# Patient Record
Sex: MALE | Race: OTHER | NOT HISPANIC OR LATINO | URBAN - METROPOLITAN AREA
[De-identification: names, ages, dates, MRNs, and addresses within clinical notes are randomized per-mention and may not be internally consistent; named-entity substitution may affect disease eponyms.]

---

## 2016-12-05 RX ORDER — DOCUSATE SODIUM 100 MG
1 CAPSULE ORAL
Qty: 0 | Refills: 0 | COMMUNITY
Start: 2016-12-05

## 2016-12-05 RX ORDER — POLYETHYLENE GLYCOL 3350 17 G/17G
17 POWDER, FOR SOLUTION ORAL
Qty: 0 | Refills: 0 | COMMUNITY
Start: 2016-12-05

## 2018-01-21 ENCOUNTER — INPATIENT (INPATIENT)
Facility: HOSPITAL | Age: 83
LOS: 3 days | Discharge: HOME | DRG: 57 | End: 2018-01-25
Attending: INTERNAL MEDICINE | Admitting: INTERNAL MEDICINE
Payer: MEDICARE

## 2018-01-21 VITALS
HEART RATE: 64 BPM | DIASTOLIC BLOOD PRESSURE: 88 MMHG | RESPIRATION RATE: 16 BRPM | OXYGEN SATURATION: 100 % | SYSTOLIC BLOOD PRESSURE: 153 MMHG

## 2018-01-21 DIAGNOSIS — R62.7 ADULT FAILURE TO THRIVE: ICD-10-CM

## 2018-01-21 LAB
ALBUMIN SERPL ELPH-MCNC: 3.8 G/DL — SIGNIFICANT CHANGE UP (ref 3.3–5)
ALP SERPL-CCNC: 75 U/L — SIGNIFICANT CHANGE UP (ref 40–120)
ALT FLD-CCNC: 23 U/L RC — SIGNIFICANT CHANGE UP (ref 10–45)
ANION GAP SERPL CALC-SCNC: 12 MMOL/L — SIGNIFICANT CHANGE UP (ref 5–17)
AST SERPL-CCNC: 32 U/L — SIGNIFICANT CHANGE UP (ref 10–40)
BASE EXCESS BLDV CALC-SCNC: 1.4 MMOL/L — SIGNIFICANT CHANGE UP (ref -2–2)
BASOPHILS # BLD AUTO: 0.1 K/UL — SIGNIFICANT CHANGE UP (ref 0–0.2)
BASOPHILS NFR BLD AUTO: 0.7 % — SIGNIFICANT CHANGE UP (ref 0–2)
BILIRUB SERPL-MCNC: 0.3 MG/DL — SIGNIFICANT CHANGE UP (ref 0.2–1.2)
BUN SERPL-MCNC: 24 MG/DL — HIGH (ref 7–23)
CA-I SERPL-SCNC: 1.4 MMOL/L — HIGH (ref 1.12–1.3)
CALCIUM SERPL-MCNC: 10.3 MG/DL — SIGNIFICANT CHANGE UP (ref 8.4–10.5)
CHLORIDE BLDV-SCNC: 103 MMOL/L — SIGNIFICANT CHANGE UP (ref 96–108)
CHLORIDE SERPL-SCNC: 102 MMOL/L — SIGNIFICANT CHANGE UP (ref 96–108)
CO2 BLDV-SCNC: 30 MMOL/L — SIGNIFICANT CHANGE UP (ref 22–30)
CO2 SERPL-SCNC: 25 MMOL/L — SIGNIFICANT CHANGE UP (ref 22–31)
CREAT SERPL-MCNC: 1.37 MG/DL — HIGH (ref 0.5–1.3)
EOSINOPHIL # BLD AUTO: 0.2 K/UL — SIGNIFICANT CHANGE UP (ref 0–0.5)
EOSINOPHIL NFR BLD AUTO: 2.3 % — SIGNIFICANT CHANGE UP (ref 0–6)
GAS PNL BLDV: 140 MMOL/L — SIGNIFICANT CHANGE UP (ref 136–145)
GAS PNL BLDV: SIGNIFICANT CHANGE UP
GLUCOSE BLDV-MCNC: 100 MG/DL — HIGH (ref 70–99)
GLUCOSE SERPL-MCNC: 103 MG/DL — HIGH (ref 70–99)
HCO3 BLDV-SCNC: 28 MMOL/L — SIGNIFICANT CHANGE UP (ref 21–29)
HCT VFR BLD CALC: 37 % — LOW (ref 39–50)
HCT VFR BLDA CALC: 39 % — SIGNIFICANT CHANGE UP (ref 39–50)
HGB BLD CALC-MCNC: 12.7 G/DL — LOW (ref 13–17)
HGB BLD-MCNC: 12.6 G/DL — LOW (ref 13–17)
LACTATE BLDV-MCNC: 1.2 MMOL/L — SIGNIFICANT CHANGE UP (ref 0.7–2)
LYMPHOCYTES # BLD AUTO: 2 K/UL — SIGNIFICANT CHANGE UP (ref 1–3.3)
LYMPHOCYTES # BLD AUTO: 28.1 % — SIGNIFICANT CHANGE UP (ref 13–44)
MAGNESIUM SERPL-MCNC: 2.3 MG/DL — SIGNIFICANT CHANGE UP (ref 1.6–2.6)
MCHC RBC-ENTMCNC: 31.9 PG — SIGNIFICANT CHANGE UP (ref 27–34)
MCHC RBC-ENTMCNC: 34 GM/DL — SIGNIFICANT CHANGE UP (ref 32–36)
MCV RBC AUTO: 93.7 FL — SIGNIFICANT CHANGE UP (ref 80–100)
MONOCYTES # BLD AUTO: 0.5 K/UL — SIGNIFICANT CHANGE UP (ref 0–0.9)
MONOCYTES NFR BLD AUTO: 6.5 % — SIGNIFICANT CHANGE UP (ref 2–14)
NEUTROPHILS # BLD AUTO: 4.6 K/UL — SIGNIFICANT CHANGE UP (ref 1.8–7.4)
NEUTROPHILS NFR BLD AUTO: 62.4 % — SIGNIFICANT CHANGE UP (ref 43–77)
PCO2 BLDV: 59 MMHG — HIGH (ref 35–50)
PH BLDV: 7.31 — LOW (ref 7.35–7.45)
PHOSPHATE SERPL-MCNC: 3.3 MG/DL — SIGNIFICANT CHANGE UP (ref 2.5–4.5)
PLATELET # BLD AUTO: 181 K/UL — SIGNIFICANT CHANGE UP (ref 150–400)
PO2 BLDV: 18 MMHG — LOW (ref 25–45)
POTASSIUM BLDV-SCNC: 4.2 MMOL/L — SIGNIFICANT CHANGE UP (ref 3.5–5)
POTASSIUM SERPL-MCNC: 4.9 MMOL/L — SIGNIFICANT CHANGE UP (ref 3.5–5.3)
POTASSIUM SERPL-SCNC: 4.9 MMOL/L — SIGNIFICANT CHANGE UP (ref 3.5–5.3)
PROT SERPL-MCNC: 7.6 G/DL — SIGNIFICANT CHANGE UP (ref 6–8.3)
RBC # BLD: 3.95 M/UL — LOW (ref 4.2–5.8)
RBC # FLD: 13 % — SIGNIFICANT CHANGE UP (ref 10.3–14.5)
SAO2 % BLDV: 20 % — LOW (ref 67–88)
SODIUM SERPL-SCNC: 139 MMOL/L — SIGNIFICANT CHANGE UP (ref 135–145)
WBC # BLD: 7.3 K/UL — SIGNIFICANT CHANGE UP (ref 3.8–10.5)
WBC # FLD AUTO: 7.3 K/UL — SIGNIFICANT CHANGE UP (ref 3.8–10.5)

## 2018-01-21 PROCEDURE — 70450 CT HEAD/BRAIN W/O DYE: CPT | Mod: 26

## 2018-01-21 PROCEDURE — 72170 X-RAY EXAM OF PELVIS: CPT | Mod: 26

## 2018-01-21 PROCEDURE — 99285 EMERGENCY DEPT VISIT HI MDM: CPT | Mod: 25

## 2018-01-21 PROCEDURE — 93010 ELECTROCARDIOGRAM REPORT: CPT

## 2018-01-21 PROCEDURE — 72125 CT NECK SPINE W/O DYE: CPT | Mod: 26

## 2018-01-21 PROCEDURE — 71045 X-RAY EXAM CHEST 1 VIEW: CPT | Mod: 26

## 2018-01-21 RX ORDER — POLYETHYLENE GLYCOL 3350 17 G/17G
17 POWDER, FOR SOLUTION ORAL DAILY
Qty: 0 | Refills: 0 | Status: DISCONTINUED | OUTPATIENT
Start: 2018-01-21 | End: 2018-01-25

## 2018-01-21 RX ORDER — SODIUM CHLORIDE 9 MG/ML
1000 INJECTION INTRAMUSCULAR; INTRAVENOUS; SUBCUTANEOUS
Qty: 0 | Refills: 0 | Status: DISCONTINUED | OUTPATIENT
Start: 2018-01-21 | End: 2018-01-23

## 2018-01-21 RX ORDER — SODIUM CHLORIDE 9 MG/ML
1000 INJECTION INTRAMUSCULAR; INTRAVENOUS; SUBCUTANEOUS ONCE
Qty: 0 | Refills: 0 | Status: COMPLETED | OUTPATIENT
Start: 2018-01-21 | End: 2018-01-21

## 2018-01-21 RX ORDER — LATANOPROST 0.05 MG/ML
1 SOLUTION/ DROPS OPHTHALMIC; TOPICAL AT BEDTIME
Qty: 0 | Refills: 0 | Status: DISCONTINUED | OUTPATIENT
Start: 2018-01-21 | End: 2018-01-25

## 2018-01-21 RX ORDER — AMLODIPINE BESYLATE 2.5 MG/1
5 TABLET ORAL DAILY
Qty: 0 | Refills: 0 | Status: DISCONTINUED | OUTPATIENT
Start: 2018-01-21 | End: 2018-01-24

## 2018-01-21 RX ORDER — LATANOPROST 0.05 MG/ML
1 SOLUTION/ DROPS OPHTHALMIC; TOPICAL
Qty: 0 | Refills: 0 | COMMUNITY

## 2018-01-21 RX ORDER — SENNA PLUS 8.6 MG/1
2 TABLET ORAL AT BEDTIME
Qty: 0 | Refills: 0 | Status: DISCONTINUED | OUTPATIENT
Start: 2018-01-21 | End: 2018-01-25

## 2018-01-21 RX ORDER — DOCUSATE SODIUM 100 MG
100 CAPSULE ORAL THREE TIMES A DAY
Qty: 0 | Refills: 0 | Status: DISCONTINUED | OUTPATIENT
Start: 2018-01-21 | End: 2018-01-25

## 2018-01-21 RX ORDER — HEPARIN SODIUM 5000 [USP'U]/ML
5000 INJECTION INTRAVENOUS; SUBCUTANEOUS EVERY 8 HOURS
Qty: 0 | Refills: 0 | Status: DISCONTINUED | OUTPATIENT
Start: 2018-01-21 | End: 2018-01-25

## 2018-01-21 RX ADMIN — HEPARIN SODIUM 5000 UNIT(S): 5000 INJECTION INTRAVENOUS; SUBCUTANEOUS at 23:40

## 2018-01-21 RX ADMIN — SODIUM CHLORIDE 1000 MILLILITER(S): 9 INJECTION INTRAMUSCULAR; INTRAVENOUS; SUBCUTANEOUS at 14:00

## 2018-01-21 RX ADMIN — SODIUM CHLORIDE 75 MILLILITER(S): 9 INJECTION INTRAMUSCULAR; INTRAVENOUS; SUBCUTANEOUS at 22:40

## 2018-01-21 NOTE — ED PROVIDER NOTE - PMH
Alzheimer disease    COPD (chronic obstructive pulmonary disease)    Dementia    Dementia in Alzheimer's disease    Glaucoma    HTN (hypertension)    HTN (hypertension)    Open-angle glaucoma, moderate stage, unspecified laterality, unspecified open-angle glaucoma type

## 2018-01-21 NOTE — ED PROVIDER NOTE - CARE PLAN
Principal Discharge DX:	Failure to thrive in adult  Secondary Diagnosis:	Alzheimer's dementia without behavioral disturbance, unspecified timing of dementia onset

## 2018-01-21 NOTE — ED ADULT NURSE REASSESSMENT NOTE - NS ED NURSE REASSESS COMMENT FT1
received report from TRAVIS Boggs. Pt lying on assigned stretcher shows no signs of any distress, no pain noted & VS WNL. awaiting bed placement at this time. Safety maintained & continue monitor.

## 2018-01-21 NOTE — ED PROVIDER NOTE - OBJECTIVE STATEMENT
89yo M with advanced alzheimers progressive decline for last month, more unsteady, minimally verbal, walks with walker, today tried to reach from sitting position and fell to the side, not to ground but may have hit head on wall. no complaint of pain, wife unable to get him up after, has no help at home. no fevers. no cough/vomiting/diarrhea. refusing PO intake and medications last 1-2 weeks.     PCP- KRISTAL SIMMONS

## 2018-01-21 NOTE — ED PROVIDER NOTE - PROGRESS NOTE DETAILS
family refusing straight cath, wife aware need specimen, will use urinal per wife. will admit at this point. CBC redrawn and in lab -Alcieds DO

## 2018-01-21 NOTE — ED PROVIDER NOTE - MEDICAL DECISION MAKING DETAILS
failure to thrive likely from alzheimers, no identified infectious source, no sign of significant trauma. will ct head/neck. labs with ekg. UA/UC. hydration. TBA for rehab/NH. wife unable to care for patient at home. wife is health care proxy and has living will, patient is DNR/DNI no feeding tubes

## 2018-01-21 NOTE — H&P ADULT - HISTORY OF PRESENT ILLNESS
89yo M with advanced alzheimer's progressive decline for last month, more unsteady, minimally verbal, walks with walker, today tried to reach from sitting position and fell to the side, not to ground but may have hit head on wall. no complaint of pain, wife unable to get him up after, has no help at home. no fevers. no cough/vomiting/diarrhea. refusing PO intake and medications last 1-2 weeks.

## 2018-01-21 NOTE — ED ADULT NURSE NOTE - OBJECTIVE STATEMENT
89 year old male presents to ED via EMS complaining of fall out of chair. History of dementia, glaucoma, HTN, COPD. EMS states patient lives with wife, patient was trying to reach for something from chair and slid out of chair, unable to get himself up so wife called 911. Patient has dementia and is at baseline mental status per wife, does not remember falling. Not complaining of any pain at this time. Did not hit head, no LOC. 89 year old male presents to ED via EMS complaining of fall out of chair. History of dementia, glaucoma, HTN, COPD. EMS states patient lives with wife, patient was trying to reach for something from chair and slid out of chair, unable to get himself up so wife called 911. Patient has dementia and is at baseline mental status per wife, does not remember falling. Not complaining of any pain at this time. Did not hit head, no LOC. Wife states he is increasingly difficult to care for at home. Non compliant with medication. Patient undressed and placed into gown, call bell in hand and side rails up with bed in lowest position for safety. blanket provided. Comfort and safety provided.

## 2018-01-21 NOTE — H&P ADULT - NEGATIVE GASTROINTESTINAL SYMPTOMS
no nausea/no constipation/no flatulence/no change in bowel habits/no vomiting/no melena/no diarrhea/no abdominal pain/no hematochezia

## 2018-01-21 NOTE — H&P ADULT - ASSESSMENT
89yo M with advanced alzheimer's progressive decline for last month, more unsteady, minimally verbal, walks with walker, today tried to reach from sitting position and fell to the side, not to ground but may have hit head on wall. no complaint of pain, wife unable to get him up after, has no help at home. no fevers. no cough/vomiting/diarrhea. refusing PO intake and medications last 1-2 weeks.     progressive dementia - check b12 , folate and TSH    anorexia - will consider supplements.  wife does not want a swallow eval at this time.    htn - cw norvasc    copd - doubt pt able to use Advair  will start Pulmicort instead    constipation - colace, senna and Miralax    pt eval    dvt px

## 2018-01-21 NOTE — ED ADULT NURSE REASSESSMENT NOTE - NS ED NURSE REASSESS COMMENT FT1
Wife at bedside does not want us to straight cath patient. Dr. Mcgrath aware that patient is refusing straight cath.

## 2018-01-21 NOTE — ED PROVIDER NOTE - PHYSICAL EXAMINATION
Gen: NAD, nonverbal  Head: NCAT  HEENT: PERRL, oral mucosa slightly dry, normal conjunctiva, neck supple  Lung: CTAB, no respiratory distress  CV: rrr, no murmur, Normal perfusion  Abd: soft, NTND  MSK: No edema, no visible deformities, able to range hips without obvious pain, no reproducible UE/LE pain  Neuro: moving all extremities equally  Skin: No rash

## 2018-01-21 NOTE — H&P ADULT - NSHPLABSRESULTS_GEN_ALL_CORE
LABS:                        12.6   7.3   )-----------( 181      ( 21 Jan 2018 16:51 )             37.0     01-21    139  |  102  |  24<H>  ----------------------------<  103<H>  4.9   |  25  |  1.37<H>    Ca    10.3      21 Jan 2018 13:36  Phos  3.3     01-21  Mg     2.3     01-21    TPro  7.6  /  Alb  3.8  /  TBili  0.3  /  DBili  x   /  AST  32  /  ALT  23  /  AlkPhos  75  01-21              RADIOLOGY & ADDITIONAL TESTS:

## 2018-01-21 NOTE — ED ADULT NURSE REASSESSMENT NOTE - NS ED NURSE REASSESS COMMENT FT1
Patient difficult PIV access, multiple attempts by multiple RNs. Dr. Mcgrath aware and inserted US guided PIV. Fluids hung as ordered and labs sent. Wife at bedside would like to avoid straight cath - states that she will get the urine sample when he voids.

## 2018-01-22 LAB
24R-OH-CALCIDIOL SERPL-MCNC: 40.5 NG/ML — SIGNIFICANT CHANGE UP (ref 30–80)
ANION GAP SERPL CALC-SCNC: 11 MMOL/L — SIGNIFICANT CHANGE UP (ref 5–17)
APPEARANCE UR: CLEAR — SIGNIFICANT CHANGE UP
BILIRUB UR-MCNC: NEGATIVE — SIGNIFICANT CHANGE UP
BUN SERPL-MCNC: 23 MG/DL — SIGNIFICANT CHANGE UP (ref 7–23)
CALCIUM SERPL-MCNC: 9.4 MG/DL — SIGNIFICANT CHANGE UP (ref 8.4–10.5)
CHLORIDE SERPL-SCNC: 106 MMOL/L — SIGNIFICANT CHANGE UP (ref 96–108)
CO2 SERPL-SCNC: 23 MMOL/L — SIGNIFICANT CHANGE UP (ref 22–31)
COLOR SPEC: YELLOW — SIGNIFICANT CHANGE UP
CREAT SERPL-MCNC: 1.16 MG/DL — SIGNIFICANT CHANGE UP (ref 0.5–1.3)
DIFF PNL FLD: NEGATIVE — SIGNIFICANT CHANGE UP
FOLATE SERPL-MCNC: >20 NG/ML — SIGNIFICANT CHANGE UP (ref 4.8–24.2)
GLUCOSE SERPL-MCNC: 86 MG/DL — SIGNIFICANT CHANGE UP (ref 70–99)
GLUCOSE UR QL: NEGATIVE MG/DL — SIGNIFICANT CHANGE UP
HCT VFR BLD CALC: 34.7 % — LOW (ref 39–50)
HGB BLD-MCNC: 11 G/DL — LOW (ref 13–17)
KETONES UR-MCNC: NEGATIVE — SIGNIFICANT CHANGE UP
LEUKOCYTE ESTERASE UR-ACNC: NEGATIVE — SIGNIFICANT CHANGE UP
MAGNESIUM SERPL-MCNC: 2 MG/DL — SIGNIFICANT CHANGE UP (ref 1.6–2.6)
MCHC RBC-ENTMCNC: 29.3 PG — SIGNIFICANT CHANGE UP (ref 27–34)
MCHC RBC-ENTMCNC: 31.7 GM/DL — LOW (ref 32–36)
MCV RBC AUTO: 92.5 FL — SIGNIFICANT CHANGE UP (ref 80–100)
NITRITE UR-MCNC: NEGATIVE — SIGNIFICANT CHANGE UP
PH UR: 7 — SIGNIFICANT CHANGE UP (ref 5–8)
PHOSPHATE SERPL-MCNC: 3 MG/DL — SIGNIFICANT CHANGE UP (ref 2.5–4.5)
PLATELET # BLD AUTO: 178 K/UL — SIGNIFICANT CHANGE UP (ref 150–400)
POTASSIUM SERPL-MCNC: 4 MMOL/L — SIGNIFICANT CHANGE UP (ref 3.5–5.3)
POTASSIUM SERPL-SCNC: 4 MMOL/L — SIGNIFICANT CHANGE UP (ref 3.5–5.3)
PROT UR-MCNC: NEGATIVE MG/DL — SIGNIFICANT CHANGE UP
RBC # BLD: 3.75 M/UL — LOW (ref 4.2–5.8)
RBC # FLD: 14.9 % — HIGH (ref 10.3–14.5)
SODIUM SERPL-SCNC: 140 MMOL/L — SIGNIFICANT CHANGE UP (ref 135–145)
SP GR SPEC: 1.02 — SIGNIFICANT CHANGE UP (ref 1.01–1.02)
TSH SERPL-MCNC: 2.3 UIU/ML — SIGNIFICANT CHANGE UP (ref 0.27–4.2)
UROBILINOGEN FLD QL: NEGATIVE MG/DL — SIGNIFICANT CHANGE UP
VIT B12 SERPL-MCNC: 1647 PG/ML — HIGH (ref 232–1245)
WBC # BLD: 6.51 K/UL — SIGNIFICANT CHANGE UP (ref 3.8–10.5)
WBC # FLD AUTO: 6.51 K/UL — SIGNIFICANT CHANGE UP (ref 3.8–10.5)

## 2018-01-22 PROCEDURE — 93010 ELECTROCARDIOGRAM REPORT: CPT

## 2018-01-22 RX ORDER — HYDRALAZINE HCL 50 MG
5 TABLET ORAL ONCE
Qty: 0 | Refills: 0 | Status: COMPLETED | OUTPATIENT
Start: 2018-01-22 | End: 2018-01-22

## 2018-01-22 RX ADMIN — LATANOPROST 1 DROP(S): 0.05 SOLUTION/ DROPS OPHTHALMIC; TOPICAL at 22:00

## 2018-01-22 RX ADMIN — HEPARIN SODIUM 5000 UNIT(S): 5000 INJECTION INTRAVENOUS; SUBCUTANEOUS at 22:00

## 2018-01-22 RX ADMIN — SENNA PLUS 2 TABLET(S): 8.6 TABLET ORAL at 22:00

## 2018-01-22 RX ADMIN — Medication 100 MILLIGRAM(S): at 14:16

## 2018-01-22 RX ADMIN — SODIUM CHLORIDE 75 MILLILITER(S): 9 INJECTION INTRAMUSCULAR; INTRAVENOUS; SUBCUTANEOUS at 22:00

## 2018-01-22 RX ADMIN — Medication 5 MILLIGRAM(S): at 05:18

## 2018-01-22 RX ADMIN — LATANOPROST 1 DROP(S): 0.05 SOLUTION/ DROPS OPHTHALMIC; TOPICAL at 00:05

## 2018-01-22 RX ADMIN — Medication 0.25 MILLIGRAM(S): at 16:45

## 2018-01-22 RX ADMIN — HEPARIN SODIUM 5000 UNIT(S): 5000 INJECTION INTRAVENOUS; SUBCUTANEOUS at 05:19

## 2018-01-22 RX ADMIN — POLYETHYLENE GLYCOL 3350 17 GRAM(S): 17 POWDER, FOR SOLUTION ORAL at 14:17

## 2018-01-22 NOTE — CHART NOTE - NSCHARTNOTEFT_GEN_A_CORE
Called by nurse with reports that  patient is restless and attempting to get out of bed without assistance. Seen and examined at bedside, patient awake and responsive verbally.  unable to follow commands. Unable to collect EKG. Will give ativan 0.25mg iv stat. Will obtain EKG once patient is calm. will endorse to incoming tour.

## 2018-01-22 NOTE — PROGRESS NOTE ADULT - ASSESSMENT
89yo M with advanced alzheimer's progressive decline for last month, more unsteady, minimally verbal, walks with walker, today tried to reach from sitting position and fell to the side, not to ground but may have hit head on wall. no complaint of pain, wife unable to get him up after, has no help at home. no fevers. no cough/vomiting/diarrhea. refusing PO intake and medications last 1-2 weeks.     progressive dementia - check b12 , folate and TSH    anorexia - will consider supplements.  wife does not want a swallow eval at this time.    htn - cw norvasc    copd - doubt pt able to use Advair  will start Pulmicort instead    constipation - colace, senna and Miralax    glaucoma - cw eye drops    pt eval    dvt px

## 2018-01-22 NOTE — PROGRESS NOTE ADULT - SUBJECTIVE AND OBJECTIVE BOX
Patient is a 89y old  Male who presents with a chief complaint of falling and not eating and refusing meds (2018 20:32)      SUBJECTIVE / OVERNIGHT EVENTS: spoke with wife at beside.  does not want swallow consult.  wants pt on regular    MEDICATIONS  (STANDING):  amLODIPine   Tablet 5 milliGRAM(s) Oral daily  Brinzolamide and Brimonidine (Simbrinza) 1 Drop(s) 1 Drop(s) Both EYES two times a day  docusate sodium 100 milliGRAM(s) Oral three times a day  heparin  Injectable 5000 Unit(s) SubCutaneous every 8 hours  latanoprost 0.005% Ophthalmic Solution 1 Drop(s) Both EYES at bedtime  multivitamin 1 Tablet(s) Oral daily  polyethylene glycol 3350 17 Gram(s) Oral daily  senna 2 Tablet(s) Oral at bedtime  sodium chloride 0.9%. 1000 milliLiter(s) (75 mL/Hr) IV Continuous <Continuous>    MEDICATIONS  (PRN):      Vital Signs Last 24 Hrs  T(C): 36.7 (2018 20:29), Max: 36.8 (2018 23:00)  T(F): 98 (2018 20:29), Max: 98.3 (2018 23:00)  HR: 80 (2018 20:29) (58 - 98)  BP: 160/74 (2018 20:29) (153/76 - 195/78)  BP(mean): --  RR: 18 (2018 20:29) (18 - 18)  SpO2: 98% (2018 20:29) (95% - 100%)  CAPILLARY BLOOD GLUCOSE        I&O's Summary    2018 07:  -  2018 07:00  --------------------------------------------------------  IN: 675 mL / OUT: 0 mL / NET: 675 mL    2018 07:01  -  2018 20:42  --------------------------------------------------------  IN: 1100 mL / OUT: 0 mL / NET: 1100 mL        PHYSICAL EXAM:  GENERAL: NAD, well-developed  HEAD:  Atraumatic, Normocephalic  EYES: EOMI, PERRLA, conjunctiva and sclera clear  NECK: Supple, No JVD  CHEST/LUNG: Clear to auscultation bilaterally; No wheeze  HEART: Regular rate and rhythm; No murmurs, rubs, or gallops  ABDOMEN: Soft, Nontender, Nondistended; Bowel sounds present  EXTREMITIES:  2+ Peripheral Pulses, No clubbing, cyanosis, or edema  PSYCH: AAOx3  NEUROLOGY: non-focal  SKIN: No rashes or lesions    LABS:                        11.0   6.51  )-----------( 178      ( 2018 06:48 )             34.7         140  |  106  |  23  ----------------------------<  86  4.0   |  23  |  1.16    Ca    9.4      2018 07:27  Phos  3.0       Mg     2.0         TPro  7.6  /  Alb  3.8  /  TBili  0.3  /  DBili  x   /  AST  32  /  ALT  23  /  AlkPhos  75            Urinalysis Basic - ( 2018 14:55 )    Color: Yellow / Appearance: Clear / S.016 / pH: x  Gluc: x / Ketone: Negative  / Bili: Negative / Urobili: Negative mg/dL   Blood: x / Protein: Negative mg/dL / Nitrite: Negative   Leuk Esterase: Negative / RBC: x / WBC x   Sq Epi: x / Non Sq Epi: x / Bacteria: x        RADIOLOGY & ADDITIONAL TESTS:    Imaging Personally Reviewed:    Consultant(s) Notes Reviewed:      Care Discussed with Consultants/Other Providers:

## 2018-01-22 NOTE — PHYSICAL THERAPY INITIAL EVALUATION ADULT - DISCHARGE DISPOSITION, PT EVAL
home w/ home PT/patient is close to baseline functional status & unable to consistently follow commands therefore making Subacute Rehab placement difficult

## 2018-01-22 NOTE — PHYSICAL THERAPY INITIAL EVALUATION ADULT - PERTINENT HX OF CURRENT PROBLEM, REHAB EVAL
as per chart review: advanced alzheimer's progressive decline for last month, more unsteady, minimally verbal, walks with walker, today tried to reach from sitting position and fell to the side, not to ground but may have hit head on wall

## 2018-01-23 ENCOUNTER — TRANSCRIPTION ENCOUNTER (OUTPATIENT)
Age: 83
End: 2018-01-23

## 2018-01-23 DIAGNOSIS — Z71.89 OTHER SPECIFIED COUNSELING: ICD-10-CM

## 2018-01-23 DIAGNOSIS — G30.9 ALZHEIMER'S DISEASE, UNSPECIFIED: ICD-10-CM

## 2018-01-23 DIAGNOSIS — R62.7 ADULT FAILURE TO THRIVE: ICD-10-CM

## 2018-01-23 DIAGNOSIS — I10 ESSENTIAL (PRIMARY) HYPERTENSION: ICD-10-CM

## 2018-01-23 DIAGNOSIS — H40.9 UNSPECIFIED GLAUCOMA: ICD-10-CM

## 2018-01-23 DIAGNOSIS — J44.9 CHRONIC OBSTRUCTIVE PULMONARY DISEASE, UNSPECIFIED: ICD-10-CM

## 2018-01-23 RX ORDER — VALPROIC ACID (AS SODIUM SALT) 250 MG/5ML
250 SOLUTION, ORAL ORAL DAILY
Qty: 0 | Refills: 0 | Status: DISCONTINUED | OUTPATIENT
Start: 2018-01-23 | End: 2018-01-23

## 2018-01-23 RX ORDER — VALPROIC ACID (AS SODIUM SALT) 250 MG/5ML
250 SOLUTION, ORAL ORAL DAILY
Qty: 0 | Refills: 0 | Status: DISCONTINUED | OUTPATIENT
Start: 2018-01-23 | End: 2018-01-25

## 2018-01-23 RX ADMIN — Medication 1 PATCH: at 16:10

## 2018-01-23 RX ADMIN — SENNA PLUS 2 TABLET(S): 8.6 TABLET ORAL at 22:45

## 2018-01-23 RX ADMIN — LATANOPROST 1 DROP(S): 0.05 SOLUTION/ DROPS OPHTHALMIC; TOPICAL at 22:45

## 2018-01-23 RX ADMIN — HEPARIN SODIUM 5000 UNIT(S): 5000 INJECTION INTRAVENOUS; SUBCUTANEOUS at 05:40

## 2018-01-23 RX ADMIN — AMLODIPINE BESYLATE 5 MILLIGRAM(S): 2.5 TABLET ORAL at 05:40

## 2018-01-23 RX ADMIN — POLYETHYLENE GLYCOL 3350 17 GRAM(S): 17 POWDER, FOR SOLUTION ORAL at 12:15

## 2018-01-23 RX ADMIN — Medication 250 MILLIGRAM(S): at 12:15

## 2018-01-23 RX ADMIN — HEPARIN SODIUM 5000 UNIT(S): 5000 INJECTION INTRAVENOUS; SUBCUTANEOUS at 22:45

## 2018-01-23 NOTE — DISCHARGE NOTE ADULT - PLAN OF CARE
Chronic AD medical management Follow up with PMD Call your Health Care provider upon arrival home to make a follow up appointment within one week.  Take all inhalers as prescribed by your Health Care Provider.  Take steroids as prescribed by your Health Care Provider.  If your cough increases infrequency and severity and/or you have shortness of breath or increased shortness of breath call your Health Care Provider.  If you develop fever, chills, night sweats, malaise, and/or change in mental status call your Health care Provider.  Nutrition is very important.  Eat small frequent meals.  Increase your activity as tolerated.  Do not stay in bed all day Follow up with your medical doctor to establish long term blood pressure treatment goals.

## 2018-01-23 NOTE — PROGRESS NOTE ADULT - ASSESSMENT
89yo M with advanced alzheimer's progressive decline for last month, more unsteady, minimally verbal, walks with walker, today tried to reach from sitting position and fell to the side, not to ground but may have hit head on wall. no complaint of pain, wife unable to get him up after, has no help at home. no fevers. no cough/vomiting/diarrhea. refusing PO intake and medications last 1-2 weeks.     progressive alzheimers dementia - b12 , folate and TSH all wnl,  geriatrics consult was called.    anorexia - will consider supplements.  wife does not want a swallow eval at this time.  encourage po intake.    uncontrolled htn - cw Norvasc. add clonidine patch.    copd - doubt pt able to use Advair  will start Pulmicort instead    constipation - colace, senna and Miralax    glaucoma - cw eye drops    pt eval    dvt px    dispo - dw wife, case management at length.  does not participate in therapy.  wife decided to take him home with home care. 89yo M with advanced alzheimer's progressive decline for last month, more unsteady, minimally verbal, walks with walker, today tried to reach from sitting position and fell to the side, not to ground but may have hit head on wall. no complaint of pain, wife unable to get him up after, has no help at home. no fevers. no cough/vomiting/diarrhea. refusing PO intake and medications last 1-2 weeks.     progressive alzheimers dementia - b12 , folate and TSH all wnl,  geriatrics consult was called.    anorexia - will consider supplements.  wife does not want a swallow eval at this time.  encourage po intake.    uncontrolled htn - cw Norvasc. add clonidine patch.    copd - doubt pt able to use Advair  will start Pulmicort instead    constipation - colace, senna and Miralax    glaucoma - cw eye drops    pt eval    dvt px    dispo - dw wife, case management at length.  pt does not follow commands.  wife decided to take him home with home care. 89yo M with advanced alzheimer's progressive decline for last month, more unsteady, minimally verbal, walks with walker, today tried to reach from sitting position and fell to the side, not to ground but may have hit head on wall. no complaint of pain, wife unable to get him up after, has no help at home. no fevers. no cough/vomiting/diarrhea. refusing PO intake and medications last 1-2 weeks.     progressive alzheimers dementia with behavioral disturbances- b12 , folate and TSH all wnl,  geriatrics consult was called.  trial of depakeene.    anorexia - will consider supplements.  wife does not want a swallow eval at this time.  encourage po intake.    uncontrolled htn - cw Norvasc. add clonidine patch.    copd - doubt pt able to use Advair  will start Pulmicort instead    constipation - colace, senna and Miralax    glaucoma - cw eye drops    pt eval    dvt px    dispo - dw wife, case management at length.  pt does not follow commands.  wife decided to take him home with home care.

## 2018-01-23 NOTE — DISCHARGE NOTE ADULT - PATIENT PORTAL LINK FT
“You can access the [facility] Patient Portal, offered by [facility], by registering with the following website: [email or web address]”

## 2018-01-23 NOTE — CHART NOTE - NSCHARTNOTEFT_GEN_A_CORE
Code status clarified with wife. She does not want life prolonging procedures such as tube feeding, intubation with mechanical ventilation, however wants CPR for cardiac arrest. A copy of Living Will placed in chart.  DNI, not DNR  Dr. Ammy Cole NP-C  #[id number]

## 2018-01-23 NOTE — DISCHARGE NOTE ADULT - CARE PLAN
Principal Discharge DX:	Alzheimer disease  Goal:	Chronic AD medical management  Assessment and plan of treatment:	Follow up with PMD  Secondary Diagnosis:	Chronic obstructive pulmonary disease, unspecified COPD type  Assessment and plan of treatment:	Call your Health Care provider upon arrival home to make a follow up appointment within one week.  Take all inhalers as prescribed by your Health Care Provider.  Take steroids as prescribed by your Health Care Provider.  If your cough increases infrequency and severity and/or you have shortness of breath or increased shortness of breath call your Health Care Provider.  If you develop fever, chills, night sweats, malaise, and/or change in mental status call your Health care Provider.  Nutrition is very important.  Eat small frequent meals.  Increase your activity as tolerated.  Do not stay in bed all day  Secondary Diagnosis:	Hypertension, unspecified type  Assessment and plan of treatment:	Follow up with your medical doctor to establish long term blood pressure treatment goals.

## 2018-01-23 NOTE — CONSULT NOTE ADULT - SUBJECTIVE AND OBJECTIVE BOX
Patient is a 89y old  Male who presents with a chief complaint of falling and not eating and refusing meds (21 Jan 2018 20:32)      HPI:from Adm H+P:  89yo M with advanced alzheimer's progressive decline for last month, more unsteady, minimally verbal, walks with walker, today tried to reach from sitting position and fell to the side, not to ground but may have hit head on wall. no complaint of pain, wife unable to get him up after, has no help at home. no fevers. no cough/vomiting/diarrhea. refusing PO intake and medications last 1-2 weeks. (21 Jan 2018 20:32)        PAST MEDICAL & SURGICAL HISTORY:  Open-angle glaucoma, moderate stage, unspecified laterality, unspecified open-angle glaucoma type  Dementia in Alzheimer's disease (  x 5 years)  Glaucoma  HTN (hypertension)  Dementia  Alzheimer disease  COPD (chronic obstructive pulmonary disease)  HTN (hypertension)  No significant past surgical history  No significant past surgical history      Mediucations:  amLODIPine   Tablet 5 milliGRAM(s) Oral daily  Brinzolamide and Brimonidine (Simbrinza) 1 Drop(s) 1 Drop(s) Both EYES two times a day  docusate sodium 100 milliGRAM(s) Oral three times a day  heparin  Injectable 5000 Unit(s) SubCutaneous every 8 hours  latanoprost 0.005% Ophthalmic Solution 1 Drop(s) Both EYES at bedtime  multivitamin 1 Tablet(s) Oral daily  polyethylene glycol 3350 17 Gram(s) Oral daily  senna 2 Tablet(s) Oral at bedtime        FAMILY HISTORY:  No pertinent family history in first degree relatives  2 kids in [place] not involved in care        Social History  2nd wife, not her kids, no help AT home, has i flight of stairs in house, can ambb c assist  needs assist c all adls ex can feed himself if he wants to eat, no IADLs  no smoker, social etoh in past  ret [employer]    REVIEW OF SYSTEMS    [per wife  General:falls, less verbal than before, nad no opain nad    Skin/Breast: no bkdn no rash  	  Ophthalmologic: no ch v  ENMT:	no ch hearing, no dysphagia    Respiratory and Thorax:no  cough no sp no sob  	  Cardiovascular:	no cp palp    Gastrointestinal:	no nvcd    Genitourinary:	no fdi    Musculoskeletal:	no pain    Neurological:	demented  , variable MS, sundowns, no violent behavior, no agit at home    Psychiatric:	    Hematology/Lymphatics:	    Endocrine:	no polyudd    Allergic/Immunologic:	  AOSN+    Vital Signs Last 24 Hrs  T(C): 36.7 (23 Jan 2018 05:08), Max: 36.7 (22 Jan 2018 20:29)  T(F): 98 (23 Jan 2018 05:08), Max: 98 (22 Jan 2018 20:29)  HR: 80 (23 Jan 2018 05:08) (58 - 98)  BP: 189/91 (23 Jan 2018 05:08) (160/74 - 195/78)  BP(mean): --  RR: 18 (23 Jan 2018 05:08) (18 - 18)  SpO2: 96% (23 Jan 2018 05:08) (95% - 99%)    Physical Exam: lethargic this am, nad  H&N: nc at p Fall  no cb no tm, om hydreated  armando cwnl   CV: rrr m  Pulm:ctab norrw  GI:BS+ soft nt   :  Extrem:  no cce  Vasc:H/M- no V v  Neuro:Speech quiet now               Affect:calm  sleepy               Memory:poor               Judgment: impaired, impulsive, sumdowns               Orientation:not               Cognition:impaired               Sensory:gr nl               Motor:rigid no cog wheel no tremor               Gait: was indep pta, assist c stairs               CN: gr nl  Psych:  Other:    Labs:  CBC Full  -  ( 22 Jan 2018 06:48 )  WBC Count : 6.51 K/uL  Hemoglobin : 11.0 g/dL  Hematocrit : 34.7 %  Platelet Count - Automated : 178 K/uL  Mean Cell Volume : 92.5 fl  Mean Cell Hemoglobin : 29.3 pg  Mean Cell Hemoglobin Concentration : 31.7 gm/dL  Auto Neutrophil # : x  Auto Lymphocyte # : x  Auto Monocyte # : x  Auto Eosinophil # : x  Auto Basophil # : x  Auto Neutrophil % : x  Auto Lymphocyte % : x  Auto Monocyte % : x  Auto Eosinophil % : x  Auto Basophil % : x      01-22    140  |  106  |  23  ----------------------------<  86  4.0   |  23  |  1.16    Ca    9.4      22 Jan 2018 07:27  Phos  3.0     01-22  Mg     2.0     01-22    TPro  7.6  /  Alb  3.8  /  TBili  0.3  /  DBili  x   /  AST  32  /  ALT  23  /  AlkPhos  75  01-21      LIVER FUNCTIONS - ( 21 Jan 2018 13:36 )  Alb: 3.8 g/dL / Pro: 7.6 g/dL / ALK PHOS: 75 U/L / ALT: 23 U/L RC / AST: 32 U/L / GGT: x                 HEALTH ISSUES - PROBLEM Dx:

## 2018-01-23 NOTE — DISCHARGE NOTE ADULT - MEDICATION SUMMARY - MEDICATIONS TO TAKE
I will START or STAY ON the medications listed below when I get home from the hospital:    ferrous sulfate 143 mg oral tablet  -- 1 tab(s) by mouth once a day  -- Indication: For Anemia    cloNIDine 0.2 mg/24 hr transdermal film, extended release  -- 1 patch by transdermal patch every 7 days   -- Indication: For HTN (hypertension)    valproic acid 250 mg/5 mL oral syrup  -- 5 milliliter(s) by mouth once a day  -- Indication: For Alzheimer disease    Advair Diskus 500 mcg-50 mcg inhalation powder  -- 1 puff(s) inhaled 2 times a day  -- Indication: For Chronic obstructive pulmonary disease, unspecified COPD type    amLODIPine 10 mg oral tablet  -- 1 tab(s) by mouth once a day  -- Indication: For HTN (hypertension)    docusate sodium 100 mg oral capsule  -- 1 cap(s) by mouth 3 times a day, As Needed  -- Indication: For Stool softner     polyethylene glycol 3350 oral powder for reconstitution  -- 17 gram(s) by mouth once a day  -- Indication: For Stool softner     Xalatan 0.005% ophthalmic solution  -- 1 drop(s) in each eye once a day (in the evening)  -- Indication: For Glaucoma    Multiple Vitamins oral liquid  -- 30 milliliter(s) by mouth once a day  -- Indication: For Multivitamin

## 2018-01-23 NOTE — PROGRESS NOTE ADULT - SUBJECTIVE AND OBJECTIVE BOX
Patient is a 89y old  Male who presents with a chief complaint of falling and not eating and refusing meds (2018 20:32)      SUBJECTIVE / OVERNIGHT EVENTS:  pt was given ativan overnight for agitation.  pt is now awake but does not communicate.    MEDICATIONS  (STANDING):  amLODIPine   Tablet 5 milliGRAM(s) Oral daily  Brinzolamide and Brimonidine (Simbrinza) 1 Drop(s) 1 Drop(s) Both EYES two times a day  docusate sodium 100 milliGRAM(s) Oral three times a day  heparin  Injectable 5000 Unit(s) SubCutaneous every 8 hours  latanoprost 0.005% Ophthalmic Solution 1 Drop(s) Both EYES at bedtime  polyethylene glycol 3350 17 Gram(s) Oral daily  senna 2 Tablet(s) Oral at bedtime  valproic  acid Syrup 250 milliGRAM(s) Oral daily    MEDICATIONS  (PRN):      Vital Signs Last 24 Hrs  T(C): 36.7 (2018 05:08), Max: 36.7 (2018 20:29)  T(F): 98 (2018 05:08), Max: 98 (2018 20:29)  HR: 80 (2018 05:08) (58 - 98)  BP: 189/91 (2018 05:08) (160/74 - 195/78)  BP(mean): --  RR: 18 (2018 05:08) (18 - 18)  SpO2: 96% (2018 05:08) (95% - 99%)  CAPILLARY BLOOD GLUCOSE        I&O's Summary    2018 07:01  -  2018 07:00  --------------------------------------------------------  IN: 1100 mL / OUT: 0 mL / NET: 1100 mL        PHYSICAL EXAM:  GENERAL: NAD, well-developed  HEAD:  Atraumatic, Normocephalic  EYES: EOMI, PERRLA, conjunctiva and sclera clear  NECK: Supple, No JVD  CHEST/LUNG: Clear to auscultation bilaterally; No wheeze  HEART: Regular rate and rhythm; No murmurs, rubs, or gallops  ABDOMEN: Soft, Nontender, Nondistended; Bowel sounds present  EXTREMITIES:  2+ Peripheral Pulses, No clubbing, cyanosis, or edema  PSYCH: AAOx3  NEUROLOGY: non-focal  SKIN: No rashes or lesions    LABS:                        11.0   6.51  )-----------( 178      ( 2018 06:48 )             34.7         140  |  106  |  23  ----------------------------<  86  4.0   |  23  |  1.16    Ca    9.4      2018 07:27  Phos  3.0       Mg     2.0         TPro  7.6  /  Alb  3.8  /  TBili  0.3  /  DBili  x   /  AST  32  /  ALT  23  /  AlkPhos  75  -          Urinalysis Basic - ( 2018 14:55 )    Color: Yellow / Appearance: Clear / S.016 / pH: x  Gluc: x / Ketone: Negative  / Bili: Negative / Urobili: Negative mg/dL   Blood: x / Protein: Negative mg/dL / Nitrite: Negative   Leuk Esterase: Negative / RBC: x / WBC x   Sq Epi: x / Non Sq Epi: x / Bacteria: x        RADIOLOGY & ADDITIONAL TESTS:    Imaging Personally Reviewed:    Consultant(s) Notes Reviewed:      Care Discussed with Consultants/Other Providers: Patient is a 89y old  Male who presents with a chief complaint of falling and not eating and refusing meds (2018 20:32)      SUBJECTIVE / OVERNIGHT EVENTS:  pt was given ativan overnight for agitation.  pt is now awake but does not communicate.    MEDICATIONS  (STANDING):  amLODIPine   Tablet 5 milliGRAM(s) Oral daily  Brinzolamide and Brimonidine (Simbrinza) 1 Drop(s) 1 Drop(s) Both EYES two times a day  docusate sodium 100 milliGRAM(s) Oral three times a day  heparin  Injectable 5000 Unit(s) SubCutaneous every 8 hours  latanoprost 0.005% Ophthalmic Solution 1 Drop(s) Both EYES at bedtime  polyethylene glycol 3350 17 Gram(s) Oral daily  senna 2 Tablet(s) Oral at bedtime  valproic  acid Syrup 250 milliGRAM(s) Oral daily    MEDICATIONS  (PRN):      Vital Signs Last 24 Hrs  T(C): 36.7 (2018 05:08), Max: 36.7 (2018 20:29)  T(F): 98 (2018 05:08), Max: 98 (2018 20:29)  HR: 80 (2018 05:08) (58 - 98)  BP: 189/91 (2018 05:08) (160/74 - 195/78)  BP(mean): --  RR: 18 (2018 05:08) (18 - 18)  SpO2: 96% (2018 05:08) (95% - 99%)  CAPILLARY BLOOD GLUCOSE        I&O's Summary    2018 07:01  -  2018 07:00  --------------------------------------------------------  IN: 1100 mL / OUT: 0 mL / NET: 1100 mL        PHYSICAL EXAM:  GENERAL: NAD, well-developed  HEAD:  Atraumatic, Normocephalic  EYES: EOMI, PERRLA, conjunctiva and sclera clear  NECK: Supple, No JVD  CHEST/LUNG: Clear to auscultation bilaterally; No wheeze  HEART: Regular rate and rhythm; No murmurs, rubs, or gallops  ABDOMEN: Soft, Nontender, Nondistended; Bowel sounds present  EXTREMITIES:  2+ Peripheral Pulses, No clubbing, cyanosis, or edema  NEUROLOGY: non-focal, does not follow commands  SKIN: No rashes or lesions    LABS:                        11.0   6.51  )-----------( 178      ( 2018 06:48 )             34.7         140  |  106  |  23  ----------------------------<  86  4.0   |  23  |  1.16    Ca    9.4      2018 07:27  Phos  3.0       Mg     2.0         TPro  7.6  /  Alb  3.8  /  TBili  0.3  /  DBili  x   /  AST  32  /  ALT  23  /  AlkPhos  75  -          Urinalysis Basic - ( 2018 14:55 )    Color: Yellow / Appearance: Clear / S.016 / pH: x  Gluc: x / Ketone: Negative  / Bili: Negative / Urobili: Negative mg/dL   Blood: x / Protein: Negative mg/dL / Nitrite: Negative   Leuk Esterase: Negative / RBC: x / WBC x   Sq Epi: x / Non Sq Epi: x / Bacteria: x        RADIOLOGY & ADDITIONAL TESTS:    Imaging Personally Reviewed:    Consultant(s) Notes Reviewed:      Care Discussed with Consultants/Other Providers:

## 2018-01-23 NOTE — DISCHARGE NOTE ADULT - HOSPITAL COURSE
. 89yo M with advanced alzheimer's progressive decline for last month, more unsteady, minimally verbal, walks with walker, today tried to reach from sitting position and fell to the side, not to ground but may have hit head on wall. no complaint of pain, wife unable to get him up after, has no help at home. no fevers. no cough/vomiting/diarrhea. refusing PO intake and medications last 1-2 weeks.     progressive alzheimers dementia with behavioral disturbances- b12 , folate and TSH all wnl,  geriatrics consult noted.  Depakote 250 qd  anorexia -  encourage po intake  uncontrolled htn - cw Norvasc. add clonidine patch increase to 0.2  dispo - dw wife, case management a  pt does not follow commands  wife decided to take him home with home care  PT to follow with established PMD  Dr Salma BURRELL with medication reconciliation discussed with Dr Gimenez

## 2018-01-23 NOTE — DISCHARGE NOTE ADULT - MEDICATION SUMMARY - MEDICATIONS TO CHANGE
I will SWITCH the dose or number of times a day I take the medications listed below when I get home from the hospital:    amLODIPine 5 mg oral tablet  -- 1 tab(s) by mouth once a day    docusate sodium 100 mg oral capsule  -- 1 cap(s) by mouth once a day, As Needed

## 2018-01-23 NOTE — CONSULT NOTE ADULT - ASSESSMENT
labs are nl  SDAT 5 year worsening  CT Micro vasc Ds  HTN bp needs to be controlle, dc IVF, wife crushes amlodipine, but could get catapress TTS patch  could try exelon patch  to see if it mitigates sundowning but also could try Depakene liquid PO at noon to start, re mood, agitation  NO DAYTIME SEDATION NO HALDOL , CALL DR MATT IF AGITATED [phone]   needs PT so should set up home care when pt is dc and also eval home for equipment  Disc c Dr SINGH, wife present, NP, and will speak c Dr Gimenez again, etc labs are nl  SDAT 5 year worsening  CT Micro vasc Ds  HTN bp needs to be controlle, dc IVF, wife crushes amlodipine, but could get catapress TTS patch  could try exelon patch  to see if it mitigates sundowning but also could try Depakene liquid PO at noon to start, re mood, agitation  NO DAYTIME SEDATION NO HALDOL , CALL DR MATT IF AGITATED [phone]   needs PT so should set up home care when pt is dc and also eval home for equipment  Disc c Dr SINGH, wife present, NP, and will speak c Dr Gimenez again, etc  Disc c Wife and chg Nurse again. wife has not given permission for DNR, so i dcd order per her instructions., Disc adv care plans (15+min)  no Dnr, Has living will, wife is HCPxy etc, nursing aware, wife is knowleable re pt wishes and all aspects pf pt care as she herself is a nurse

## 2018-01-23 NOTE — CONSULT NOTE ADULT - CONSULT REASON
Geriatrics  Hx Dementia SDAT x 5 years progessively worse over past few months w sundowning afternoons and falls not treated w dementia meds nor antidepressants in past, fell at home off chair , wife could not lifty him , biba, agitated in hosp, but not at home. oftern not coop, seen by PT rec home , BPelev in hosp , per wife not at home, poss 2.2 IVNS. takes meds when crushed, sometimes resistant, sometime left alone at home, noprior assistance at home,

## 2018-01-23 NOTE — PROVIDER CONTACT NOTE (OTHER) - ASSESSMENT
Patient Awake and resting comfortably in bed. No nonverbal s/s of pain, dyspnea, distress and/or discomfort noted at this time

## 2018-01-23 NOTE — DISCHARGE NOTE ADULT - CARE PROVIDER_API CALL
Emelyn Gimenez), [facility] Medicine; Internal Medicine  [address] [place] [address]  Phone: [phone]

## 2018-01-23 NOTE — PROVIDER CONTACT NOTE (OTHER) - ACTION/TREATMENT ORDERED:
PA made aware. AM amlodipine 5mg to be given as scheduled. Will continue to monitor.
NP to order ativan

## 2018-01-24 DIAGNOSIS — H40.10X2 UNSPECIFIED OPEN-ANGLE GLAUCOMA, MODERATE STAGE: ICD-10-CM

## 2018-01-24 DIAGNOSIS — R27.0 ATAXIA, UNSPECIFIED: ICD-10-CM

## 2018-01-24 LAB
ANION GAP SERPL CALC-SCNC: 10 MMOL/L — SIGNIFICANT CHANGE UP (ref 5–17)
BUN SERPL-MCNC: 14 MG/DL — SIGNIFICANT CHANGE UP (ref 7–23)
CALCIUM SERPL-MCNC: 9.5 MG/DL — SIGNIFICANT CHANGE UP (ref 8.4–10.5)
CHLORIDE SERPL-SCNC: 108 MMOL/L — SIGNIFICANT CHANGE UP (ref 96–108)
CO2 SERPL-SCNC: 24 MMOL/L — SIGNIFICANT CHANGE UP (ref 22–31)
CREAT SERPL-MCNC: 0.87 MG/DL — SIGNIFICANT CHANGE UP (ref 0.5–1.3)
GLUCOSE SERPL-MCNC: 92 MG/DL — SIGNIFICANT CHANGE UP (ref 70–99)
HCT VFR BLD CALC: 33.3 % — LOW (ref 39–50)
HGB BLD-MCNC: 10.6 G/DL — LOW (ref 13–17)
MCHC RBC-ENTMCNC: 28.5 PG — SIGNIFICANT CHANGE UP (ref 27–34)
MCHC RBC-ENTMCNC: 31.8 GM/DL — LOW (ref 32–36)
MCV RBC AUTO: 89.5 FL — SIGNIFICANT CHANGE UP (ref 80–100)
PLATELET # BLD AUTO: 179 K/UL — SIGNIFICANT CHANGE UP (ref 150–400)
POTASSIUM SERPL-MCNC: 4.3 MMOL/L — SIGNIFICANT CHANGE UP (ref 3.5–5.3)
POTASSIUM SERPL-SCNC: 4.3 MMOL/L — SIGNIFICANT CHANGE UP (ref 3.5–5.3)
RBC # BLD: 3.72 M/UL — LOW (ref 4.2–5.8)
RBC # FLD: 14.8 % — HIGH (ref 10.3–14.5)
SODIUM SERPL-SCNC: 142 MMOL/L — SIGNIFICANT CHANGE UP (ref 135–145)
WBC # BLD: 5.08 K/UL — SIGNIFICANT CHANGE UP (ref 3.8–10.5)
WBC # FLD AUTO: 5.08 K/UL — SIGNIFICANT CHANGE UP (ref 3.8–10.5)

## 2018-01-24 RX ORDER — AMLODIPINE BESYLATE 2.5 MG/1
10 TABLET ORAL DAILY
Qty: 0 | Refills: 0 | Status: DISCONTINUED | OUTPATIENT
Start: 2018-01-24 | End: 2018-01-25

## 2018-01-24 RX ADMIN — Medication 250 MILLIGRAM(S): at 12:23

## 2018-01-24 RX ADMIN — LATANOPROST 1 DROP(S): 0.05 SOLUTION/ DROPS OPHTHALMIC; TOPICAL at 21:52

## 2018-01-24 RX ADMIN — POLYETHYLENE GLYCOL 3350 17 GRAM(S): 17 POWDER, FOR SOLUTION ORAL at 12:23

## 2018-01-24 RX ADMIN — AMLODIPINE BESYLATE 10 MILLIGRAM(S): 2.5 TABLET ORAL at 09:28

## 2018-01-24 RX ADMIN — HEPARIN SODIUM 5000 UNIT(S): 5000 INJECTION INTRAVENOUS; SUBCUTANEOUS at 05:52

## 2018-01-24 RX ADMIN — HEPARIN SODIUM 5000 UNIT(S): 5000 INJECTION INTRAVENOUS; SUBCUTANEOUS at 21:52

## 2018-01-24 RX ADMIN — HEPARIN SODIUM 5000 UNIT(S): 5000 INJECTION INTRAVENOUS; SUBCUTANEOUS at 13:29

## 2018-01-24 NOTE — PROGRESS NOTE ADULT - ASSESSMENT
dementia, started depakene liquid for sundowning, no agitation reported  BP hi, willl incr norvasc  needs to be oob and anbulate daytime

## 2018-01-24 NOTE — ADVANCED PRACTICE NURSE CONSULT - ASSESSMENT
Pt referred by Dr Gimenez and Case Management. Meeting scheduled w/ the pt's spouse for 1/25/18 to discuss hospice services. Thank you.

## 2018-01-24 NOTE — PROGRESS NOTE ADULT - SUBJECTIVE AND OBJECTIVE BOX
Patient is a 89y old  Male who presents with a chief complaint of falling and not eating and refusing meds (2018 13:23)      SUBJECTIVE / OVERNIGHT EVENTS:  pt not in any distress.  pt can not say his own name.. nonverbal.  wife at bedside.    MEDICATIONS  (STANDING):  amLODIPine   Tablet 10 milliGRAM(s) Oral daily  Brinzolamide and Brimonidine (Simbrinza) 1 Drop(s) 1 Drop(s) Both EYES two times a day  cloNIDine Patch 0.1 mG/24Hr(s) 1 patch Topical every 7 days  docusate sodium 100 milliGRAM(s) Oral three times a day  heparin  Injectable 5000 Unit(s) SubCutaneous every 8 hours  latanoprost 0.005% Ophthalmic Solution 1 Drop(s) Both EYES at bedtime  polyethylene glycol 3350 17 Gram(s) Oral daily  senna 2 Tablet(s) Oral at bedtime  valproic  acid Syrup 250 milliGRAM(s) Oral daily    MEDICATIONS  (PRN):      Vital Signs Last 24 Hrs  T(C): 36.7 (2018 04:32), Max: 36.9 (2018 21:02)  T(F): 98.1 (2018 04:32), Max: 98.5 (2018 21:02)  HR: 68 (2018 09:26) (68 - 88)  BP: 168/80 (2018 09:26) (140/98 - 172/83)  BP(mean): --  RR: 18 (2018 09:26) (18 - 18)  SpO2: 97% (2018 09:26) (95% - 100%)  CAPILLARY BLOOD GLUCOSE        I&O's Summary    2018 07:01  -  2018 07:00  --------------------------------------------------------  IN: 340 mL / OUT: 0 mL / NET: 340 mL        PHYSICAL EXAM:  GENERAL: NAD, well-developed  HEAD:  Atraumatic, Normocephalic  EYES: EOMI, PERRLA, conjunctiva and sclera clear  NECK: Supple, No JVD  CHEST/LUNG: Clear to auscultation bilaterally; No wheeze  HEART: Regular rate and rhythm; No murmurs, rubs, or gallops  ABDOMEN: Soft, Nontender, Nondistended; Bowel sounds present  EXTREMITIES:  2+ Peripheral Pulses, No clubbing, cyanosis, or edema  NEUROLOGY: non-focal  SKIN: No rashes or lesions    LABS:                        10.6   5.08  )-----------( 179      ( 2018 07:45 )             33.3     -24    142  |  108  |  14  ----------------------------<  92  4.3   |  24  |  0.87    Ca    9.5      2018 07:50            Urinalysis Basic - ( 2018 14:55 )    Color: Yellow / Appearance: Clear / S.016 / pH: x  Gluc: x / Ketone: Negative  / Bili: Negative / Urobili: Negative mg/dL   Blood: x / Protein: Negative mg/dL / Nitrite: Negative   Leuk Esterase: Negative / RBC: x / WBC x   Sq Epi: x / Non Sq Epi: x / Bacteria: x        RADIOLOGY & ADDITIONAL TESTS:    Imaging Personally Reviewed:    Consultant(s) Notes Reviewed:      Care Discussed with Consultants/Other Providers:

## 2018-01-24 NOTE — PROGRESS NOTE ADULT - SUBJECTIVE AND OBJECTIVE BOX
HPI:from adm H+P:  89yo M with advanced alzheimer's progressive decline for last month, more unsteady, minimally verbal, walks with walker, today tried to reach from sitting position and fell to the side, not to ground but may have hit head on wall. no complaint of pain, wife unable to get him up after, has no help at home. no fevers. no cough/vomiting/diarrhea. refusing PO intake and medications last 1-2 weeks. (21 Jan 2018 20:32)      Interval Events  started depakene liquid  BP remains Hi in Hospital, Started catapres TTS1, on norvasc 5 at home and here, can inc to 10mg, wife crushes med  no reported agitation  needs to be oob and ambulate    MEDICATIONS  (STANDING):  amLODIPine   Tablet 5 milliGRAM(s) Oral daily  Brinzolamide and Brimonidine (Simbrinza) 1 Drop(s) 1 Drop(s) Both EYES two times a day  cloNIDine Patch 0.1 mG/24Hr(s) 1 patch Topical every 7 days  docusate sodium 100 milliGRAM(s) Oral three times a day  heparin  Injectable 5000 Unit(s) SubCutaneous every 8 hours  latanoprost 0.005% Ophthalmic Solution 1 Drop(s) Both EYES at bedtime  polyethylene glycol 3350 17 Gram(s) Oral daily  senna 2 Tablet(s) Oral at bedtime  valproic  acid Syrup 250 milliGRAM(s) Oral daily    MEDICATIONS  (PRN):      Patient is a 89y old  Male who presents with a chief complaint of falling and not eating and refusing meds (23 Jan 2018 13:23)      REVIEW OF SYSTEMS    General:no co , sleepy  Ophthalmologic:no ch v  ENMT:	no ch h  Respiratory and Thorax:no cough no sp  Cardiovascular:no cp palp  Gastrointestinal:no nvcd  Genitourinary:  Musculoskeletal:	  Neurological:	  Psychiatric:	  Hematology/Lymphatics:	  Endocrine:	  Allergic/Immunologic:  AOSN	+      Vital Signs Last 24 Hrs  T(C): 36.7 (24 Jan 2018 04:32), Max: 36.9 (23 Jan 2018 21:02)  T(F): 98.1 (24 Jan 2018 04:32), Max: 98.5 (23 Jan 2018 21:02)  HR: 78 (24 Jan 2018 04:32) (78 - 88)  BP: 171/84 (24 Jan 2018 04:32) (140/98 - 172/83)  BP(mean): --  RR: 18 (24 Jan 2018 04:32) (18 - 18)  SpO2: 95% (24 Jan 2018 04:32) (95% - 100%)    PHYSICAL EXAM:    Constitutional:sleepy, nods, nad  H+N ncat p Fall  Eyes:armando cwnl  ENMT:om hydr  Neck:  Breasts:  Back:  Respiratory:ctab no rrw  Cardiovascular:rrr   Gastrointestinal:soft nt  Genitourinary:  Rectal:  Extremities:no cce  Vascular:  Neurological:lethargic , cooperative  Skin:  Lymph Nodes:  Musculoskeletal:  Psychiatric:demented    LABS : P this am                  Imaging:    Xray:    Echo:    CT:    MRI:    Tele:    Orders:    ARTURO Robert [phone]

## 2018-01-24 NOTE — PROGRESS NOTE ADULT - ASSESSMENT
89yo M with advanced alzheimer's progressive decline for last month, more unsteady, minimally verbal, walks with walker, today tried to reach from sitting position and fell to the side, not to ground but may have hit head on wall. no complaint of pain, wife unable to get him up after, has no help at home. no fevers. no cough/vomiting/diarrhea. refusing PO intake and medications last 1-2 weeks.     progressive alzheimers dementia with behavioral disturbances- b12 , folate and TSH all wnl,  geriatrics consult noted.  trial of depakeene.    anorexia - will consider supplements.  wife does not want a swallow eval at this time.  encourage po intake.    uncontrolled htn - cw Norvasc. add clonidine patch.    copd - doubt pt able to use Advair  will start Pulmicort instead    constipation - colace, senna and Miralax    glaucoma - cw eye drops    pt eval    dvt px    dispo - dw wife, case management at length.  pt does not follow commands.  wife decided to take him home with home care.    hospice eval called.    plan for discharge tomorrow.

## 2018-01-25 VITALS
RESPIRATION RATE: 18 BRPM | HEART RATE: 75 BPM | TEMPERATURE: 98 F | SYSTOLIC BLOOD PRESSURE: 137 MMHG | OXYGEN SATURATION: 99 % | DIASTOLIC BLOOD PRESSURE: 94 MMHG

## 2018-01-25 DIAGNOSIS — D64.9 ANEMIA, UNSPECIFIED: ICD-10-CM

## 2018-01-25 PROCEDURE — 85014 HEMATOCRIT: CPT

## 2018-01-25 PROCEDURE — 72170 X-RAY EXAM OF PELVIS: CPT

## 2018-01-25 PROCEDURE — 81003 URINALYSIS AUTO W/O SCOPE: CPT

## 2018-01-25 PROCEDURE — 83605 ASSAY OF LACTIC ACID: CPT

## 2018-01-25 PROCEDURE — 97161 PT EVAL LOW COMPLEX 20 MIN: CPT

## 2018-01-25 PROCEDURE — 84295 ASSAY OF SERUM SODIUM: CPT

## 2018-01-25 PROCEDURE — 84443 ASSAY THYROID STIM HORMONE: CPT

## 2018-01-25 PROCEDURE — 82947 ASSAY GLUCOSE BLOOD QUANT: CPT

## 2018-01-25 PROCEDURE — 84132 ASSAY OF SERUM POTASSIUM: CPT

## 2018-01-25 PROCEDURE — 82306 VITAMIN D 25 HYDROXY: CPT

## 2018-01-25 PROCEDURE — 99285 EMERGENCY DEPT VISIT HI MDM: CPT | Mod: 25

## 2018-01-25 PROCEDURE — G0378: CPT

## 2018-01-25 PROCEDURE — 82435 ASSAY OF BLOOD CHLORIDE: CPT

## 2018-01-25 PROCEDURE — 70450 CT HEAD/BRAIN W/O DYE: CPT

## 2018-01-25 PROCEDURE — 80048 BASIC METABOLIC PNL TOTAL CA: CPT

## 2018-01-25 PROCEDURE — 71045 X-RAY EXAM CHEST 1 VIEW: CPT

## 2018-01-25 PROCEDURE — 97116 GAIT TRAINING THERAPY: CPT

## 2018-01-25 PROCEDURE — 97110 THERAPEUTIC EXERCISES: CPT

## 2018-01-25 PROCEDURE — 82330 ASSAY OF CALCIUM: CPT

## 2018-01-25 PROCEDURE — 80053 COMPREHEN METABOLIC PANEL: CPT

## 2018-01-25 PROCEDURE — 72125 CT NECK SPINE W/O DYE: CPT

## 2018-01-25 PROCEDURE — 93005 ELECTROCARDIOGRAM TRACING: CPT

## 2018-01-25 PROCEDURE — 82607 VITAMIN B-12: CPT

## 2018-01-25 PROCEDURE — 82746 ASSAY OF FOLIC ACID SERUM: CPT

## 2018-01-25 PROCEDURE — 82803 BLOOD GASES ANY COMBINATION: CPT

## 2018-01-25 PROCEDURE — 85027 COMPLETE CBC AUTOMATED: CPT

## 2018-01-25 PROCEDURE — 84100 ASSAY OF PHOSPHORUS: CPT

## 2018-01-25 PROCEDURE — 83735 ASSAY OF MAGNESIUM: CPT

## 2018-01-25 RX ORDER — VALPROIC ACID (AS SODIUM SALT) 250 MG/5ML
5 SOLUTION, ORAL ORAL
Qty: 1 | Refills: 0 | OUTPATIENT
Start: 2018-01-25 | End: 2018-02-23

## 2018-01-25 RX ORDER — AMLODIPINE BESYLATE 2.5 MG/1
1 TABLET ORAL
Qty: 0 | Refills: 0 | COMMUNITY

## 2018-01-25 RX ORDER — BRINZOLAMIDE/BRIMONIDINE TARTRATE 10; 2 MG/ML; MG/ML
1 SUSPENSION/ DROPS OPHTHALMIC
Qty: 0 | Refills: 0 | COMMUNITY

## 2018-01-25 RX ORDER — AMLODIPINE BESYLATE 2.5 MG/1
1 TABLET ORAL
Qty: 30 | Refills: 0 | OUTPATIENT
Start: 2018-01-25 | End: 2018-02-23

## 2018-01-25 RX ORDER — SENNOSIDES/DOCUSATE SODIUM 8.6MG-50MG
2 TABLET ORAL
Qty: 0 | Refills: 0 | COMMUNITY

## 2018-01-25 RX ADMIN — Medication 1 PATCH: at 12:06

## 2018-01-25 RX ADMIN — Medication 1 PATCH: at 12:48

## 2018-01-25 RX ADMIN — AMLODIPINE BESYLATE 10 MILLIGRAM(S): 2.5 TABLET ORAL at 06:02

## 2018-01-25 RX ADMIN — POLYETHYLENE GLYCOL 3350 17 GRAM(S): 17 POWDER, FOR SOLUTION ORAL at 12:12

## 2018-01-25 RX ADMIN — Medication 250 MILLIGRAM(S): at 12:12

## 2018-01-25 RX ADMIN — HEPARIN SODIUM 5000 UNIT(S): 5000 INJECTION INTRAVENOUS; SUBCUTANEOUS at 06:02

## 2018-01-25 NOTE — PROGRESS NOTE ADULT - SUBJECTIVE AND OBJECTIVE BOX
HPI:from adm H+P:  89yo M with advanced alzheimer's progressive decline for last month, more unsteady, minimally verbal, walks with walker, today tried to reach from sitting position and fell to the side, not to ground but may have hit head on wall. no complaint of pain, wife unable to get him up after, has no help at home. no fevers. no cough/vomiting/diarrhea. refusing PO intake and medications last 1-2 weeks. (21 Jan 2018 20:32)      Interval Events  no change ex decr h/h in hosp.   no agitation reported. per nsg calm and coop since initial event then started depakene  see note Dr SINGH, wife will speak c hospice today (unclear why, she did not want DNR)   per nurse ate alittle yesterday, getting up for breakfast now  BP still high    MEDICATIONS  (STANDING):  amLODIPine   Tablet 10 milliGRAM(s) Oral daily  Brinzolamide and Brimonidine (Simbrinza) 1 Drop(s) 1 Drop(s) Both EYES two times a day  cloNIDine Patch 0.1 mG/24Hr(s) 1 patch Topical every 7 days  docusate sodium 100 milliGRAM(s) Oral three times a day  heparin  Injectable 5000 Unit(s) SubCutaneous every 8 hours  latanoprost 0.005% Ophthalmic Solution 1 Drop(s) Both EYES at bedtime  polyethylene glycol 3350 17 Gram(s) Oral daily  senna 2 Tablet(s) Oral at bedtime  valproic  acid Syrup 250 milliGRAM(s) Oral daily    MEDICATIONS  (PRN):      Patient is a 89y old  Male who presents with a chief complaint of falling and not eating and refusing meds (23 Jan 2018 13:23)      REVIEW OF SYSTEMS    General:nods, awake , nad no co  Skin/Breast:  Ophthalmologic:  ENMT:	  Respiratory and Thorax: no cough no sp no sob  Cardiovascular:no cp palp  Gastrointestinal:  Genitourinary:  Musculoskeletal:	no pain  Neurological:	  Psychiatric:	  Hematology/Lymphatics:	  Endocrine:	  Allergic/Immunologic:  AOSN	      Vital Signs Last 24 Hrs  T(C): 36.7 (25 Jan 2018 05:42), Max: 36.8 (24 Jan 2018 14:40)  T(F): 98 (25 Jan 2018 05:42), Max: 98.2 (24 Jan 2018 14:40)  HR: 76 (25 Jan 2018 05:42) (68 - 87)  BP: 169/83 (25 Jan 2018 05:42) (149/80 - 169/83)  BP(mean): --  RR: 18 (25 Jan 2018 05:42) (18 - 18)  SpO2: 99% (25 Jan 2018 05:42) (97% - 99%)    PHYSICAL EXAM:    Constitutional:vssa ex BP still high, nad, awake nods  H+N ncat p fall at home  Eyes:armando cwnl  ENMT:  Neck:  Breasts:  Back:  Respiratory:ctab no rrw  Cardiovascular:rrr  Gastrointestinal:soft nt  Genitourinary:  Rectal:  Extremities:no cce  Vascular:  Neurological:demented  Skin:  Lymph Nodes:  Musculoskeletal:  Psychiatric:    LABS  CBC Full  -  ( 24 Jan 2018 07:45 )  WBC Count : 5.08 K/uL  Hemoglobin : 10.6 g/dL  Hematocrit : 33.3 %  Platelet Count - Automated : 179 K/uL  Mean Cell Volume : 89.5 fl  Mean Cell Hemoglobin : 28.5 pg  Mean Cell Hemoglobin Concentration : 31.8 gm/dL  Auto Neutrophil # : x  Auto Lymphocyte # : x  Auto Monocyte # : x  Auto Eosinophil # : x  Auto Basophil # : x  Auto Neutrophil % : x  Auto Lymphocyte % : x  Auto Monocyte % : x  Auto Eosinophil % : x  Auto Basophil % : x      01-24    142  |  108  |  14  ----------------------------<  92  4.3   |  24  |  0.87    Ca    9.5      24 Jan 2018 07:50            Imaging:    Xray:    Echo:    CT:    MRI:    Tele:    Orders:    ARTURO Robert [phone]

## 2018-01-25 NOTE — PROGRESS NOTE ADULT - PROVIDER SPECIALTY LIST ADULT
Family Medicine
Family Medicine
Internal Medicine

## 2018-01-25 NOTE — PROGRESS NOTE ADULT - SUBJECTIVE AND OBJECTIVE BOX
Patient is a 89y old  Male who presents with a chief complaint of falling and not eating and refusing meds (23 Jan 2018 13:23)      SUBJECTIVE / OVERNIGHT EVENTS:   doing better todat.  walked with pt and was able to follow commands.  tolerating diet.    MEDICATIONS  (STANDING):  amLODIPine   Tablet 10 milliGRAM(s) Oral daily  Brinzolamide and Brimonidine (Simbrinza) 1 Drop(s) 1 Drop(s) Both EYES two times a day  cloNIDine Patch 0.2 mG/24Hr(s) 1 patch Topical every 7 days  docusate sodium 100 milliGRAM(s) Oral three times a day  heparin  Injectable 5000 Unit(s) SubCutaneous every 8 hours  latanoprost 0.005% Ophthalmic Solution 1 Drop(s) Both EYES at bedtime  polyethylene glycol 3350 17 Gram(s) Oral daily  senna 2 Tablet(s) Oral at bedtime  valproic  acid Syrup 250 milliGRAM(s) Oral daily    MEDICATIONS  (PRN):      Vital Signs Last 24 Hrs  T(C): 36.7 (25 Jan 2018 05:42), Max: 36.8 (24 Jan 2018 14:40)  T(F): 98 (25 Jan 2018 05:42), Max: 98.2 (24 Jan 2018 14:40)  HR: 76 (25 Jan 2018 05:42) (75 - 87)  BP: 169/83 (25 Jan 2018 05:42) (149/80 - 169/83)  BP(mean): --  RR: 18 (25 Jan 2018 05:42) (18 - 18)  SpO2: 99% (25 Jan 2018 05:42) (99% - 99%)  CAPILLARY BLOOD GLUCOSE        I&O's Summary    24 Jan 2018 07:01  -  25 Jan 2018 07:00  --------------------------------------------------------  IN: 600 mL / OUT: 0 mL / NET: 600 mL        PHYSICAL EXAM:  GENERAL: NAD, well-developed  HEAD:  Atraumatic, Normocephalic  EYES: EOMI, PERRLA, conjunctiva and sclera clear  NECK: Supple, No JVD  CHEST/LUNG: Clear to auscultation bilaterally; No wheeze  HEART: Regular rate and rhythm; No murmurs, rubs, or gallops  ABDOMEN: Soft, Nontender, Nondistended; Bowel sounds present  EXTREMITIES:  2+ Peripheral Pulses, No clubbing, cyanosis, or edema    NEUROLOGY: non-focal  SKIN: No rashes or lesions    LABS:                        10.6   5.08  )-----------( 179      ( 24 Jan 2018 07:45 )             33.3     01-24    142  |  108  |  14  ----------------------------<  92  4.3   |  24  |  0.87    Ca    9.5      24 Jan 2018 07:50                RADIOLOGY & ADDITIONAL TESTS:    Imaging Personally Reviewed:    Consultant(s) Notes Reviewed:      Care Discussed with Consultants/Other Providers:

## 2018-01-25 NOTE — PROGRESS NOTE ADULT - ASSESSMENT
89yo M with advanced alzheimer's progressive decline for last month, more unsteady, minimally verbal, walks with walker, today tried to reach from sitting position and fell to the side, not to ground but may have hit head on wall. no complaint of pain, wife unable to get him up after, has no help at home. no fevers. no cough/vomiting/diarrhea. refusing PO intake and medications last 1-2 weeks.     progressive alzheimers dementia with behavioral disturbances- b12 , folate and TSH all wnl,  geriatrics consult noted.  Depakote 250 qd    anorexia -  encourage po intake.    uncontrolled htn - cw Norvasc. add clonidine patch increase to 0.2.    copd - cw advair    constipation - colace, senna and Miralax    glaucoma - cw eye drops    pt eval    dvt px    dispo - dw wife, case management at length.  pt does not follow commands.  wife decided to take him home with home care.    seen by hospice.. does not meet criteria at this time.    plan for discharge today with home care.

## 2018-01-25 NOTE — ADVANCED PRACTICE NURSE CONSULT - ASSESSMENT
Pt was referred by Dr Gimenez for home hospice services. Met w/ the pt's spouse, hospice services discussed at length. Case reviewed w/ Dr Nafisa Madrid, [facility] Medical Director, pt does not meet the Medicare criteria for end stage dementia at this time. Spouse and Lissette Rendon CM made aware the pt was not approved for hospice. Criteria for end stage dementia reviewed with spouse. [facility] contact information provided, spouse encouraged to call if the pt continues to decline. Thank you.

## 2019-12-03 NOTE — ED ADULT NURSE NOTE - NS ED NOTE  TALK SOMEONE YN
No
You can access the FollowMyHealth Patient Portal offered by [facility] by registering at the following website: [email or web address]. By joining [employer]’s FollowMyHealth portal, you will also be able to view your health information using other applications (apps) compatible with our system.

## 2021-04-28 NOTE — PROVIDER CONTACT NOTE (OTHER) - SITUATION
pt agitated, trying to climb OOB If you are a smoker, it is important for your health to stop smoking. Please be aware that second hand smoke is also harmful.

## 2021-11-13 NOTE — ED PROVIDER NOTE - ATTENDING CONTRIBUTION TO CARE
Pt discharged to home, alert and oriented. Denies any questions about discharge instructions. Will follow up as directed. encouraged to return for any worsening symptoms.         Addison Espinal RN  11/13/21 [id number]
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 As per wife pt has been declining over the past mo w decreased mobility and talking and non compliant w meds. Today went to reach for something and fell from chair. As per wife, no trauma to head and no complaints, states is selective at speaking. Pt limited historian.   On exam, Patient is awake,alert,oriented x 3. Patient is well appearing and in no acute distress. Patient's chest is clear to ausculation, +s1s2. Abdomen is soft nd/nt +BS. Extremity with no swelling or calf tenderness. Pelvis stable.   Check Labs, CT/Xray. Wife feels that she needs assistance at home for his care and is unable to take care of him with his decline.

## 2023-10-25 NOTE — ED PROVIDER NOTE - DATE/TIME 1
Kath Carroll  Internal Medicine  [address] [place] [id number]  Phone: [phone]  Fax: [phone]  Follow Up Time: 1 week   21-Jan-2018 16:49